# Patient Record
Sex: FEMALE | Race: WHITE | NOT HISPANIC OR LATINO
[De-identification: names, ages, dates, MRNs, and addresses within clinical notes are randomized per-mention and may not be internally consistent; named-entity substitution may affect disease eponyms.]

---

## 2019-06-12 ENCOUNTER — APPOINTMENT (OUTPATIENT)
Dept: ORTHOPEDIC SURGERY | Facility: CLINIC | Age: 49
End: 2019-06-12
Payer: COMMERCIAL

## 2019-06-12 VITALS — BODY MASS INDEX: 24.8 KG/M2 | HEIGHT: 63 IN | WEIGHT: 140 LBS | RESPIRATION RATE: 16 BRPM

## 2019-06-12 DIAGNOSIS — Z00.00 ENCOUNTER FOR GENERAL ADULT MEDICAL EXAMINATION W/OUT ABNORMAL FINDINGS: ICD-10-CM

## 2019-06-12 DIAGNOSIS — M77.11 LATERAL EPICONDYLITIS, RIGHT ELBOW: ICD-10-CM

## 2019-06-12 DIAGNOSIS — Z82.61 FAMILY HISTORY OF ARTHRITIS: ICD-10-CM

## 2019-06-12 DIAGNOSIS — Z87.891 PERSONAL HISTORY OF NICOTINE DEPENDENCE: ICD-10-CM

## 2019-06-12 DIAGNOSIS — M77.01 MEDIAL EPICONDYLITIS, RIGHT ELBOW: ICD-10-CM

## 2019-06-12 PROCEDURE — 73070 X-RAY EXAM OF ELBOW: CPT | Mod: RT

## 2019-06-12 PROCEDURE — 99203 OFFICE O/P NEW LOW 30 MIN: CPT

## 2023-06-13 ENCOUNTER — APPOINTMENT (OUTPATIENT)
Dept: ORTHOPEDIC SURGERY | Facility: CLINIC | Age: 53
End: 2023-06-13
Payer: COMMERCIAL

## 2023-06-13 ENCOUNTER — TRANSCRIPTION ENCOUNTER (OUTPATIENT)
Age: 53
End: 2023-06-13

## 2023-06-13 VITALS — WEIGHT: 135 LBS | BODY MASS INDEX: 23.92 KG/M2 | HEIGHT: 63 IN

## 2023-06-13 DIAGNOSIS — M77.10 LATERAL EPICONDYLITIS, UNSPECIFIED ELBOW: ICD-10-CM

## 2023-06-13 PROCEDURE — 99204 OFFICE O/P NEW MOD 45 MIN: CPT

## 2023-06-13 PROCEDURE — 72050 X-RAY EXAM NECK SPINE 4/5VWS: CPT

## 2023-06-13 RX ORDER — DICLOFENAC SODIUM 75 MG/1
75 TABLET, DELAYED RELEASE ORAL
Qty: 60 | Refills: 1 | Status: ACTIVE | COMMUNITY
Start: 2023-06-13 | End: 1900-01-01

## 2023-06-15 ENCOUNTER — NON-APPOINTMENT (OUTPATIENT)
Age: 53
End: 2023-06-15

## 2023-06-15 ENCOUNTER — APPOINTMENT (OUTPATIENT)
Dept: PHYSICAL MEDICINE AND REHAB | Facility: CLINIC | Age: 53
End: 2023-06-15
Payer: COMMERCIAL

## 2023-06-15 VITALS — BODY MASS INDEX: 23.92 KG/M2 | WEIGHT: 135 LBS | HEIGHT: 63 IN

## 2023-06-15 PROCEDURE — 99204 OFFICE O/P NEW MOD 45 MIN: CPT | Mod: 95

## 2023-06-15 NOTE — CONSULT LETTER
[FreeTextEntry1] : Dear Dr. MAYERS  \par \par I had the pleasure of evaluating your patient, LOKESH MONDRAGON .\par \par Thank you very much for allowing me to participate in the care of this patient. If you have any questions, please do not hesitate to contact me. \par \par Sincerely, \par Brian Dominique MD \par \par ABPMR Board Certified in Physical Medicine and Rehabilitation\par Certified Fellow of AANEM (Neuromuscular and Electrodiagnostic Medicine)\par Subspecialty certified in Sports Medicine (ABPMR)\par \par  of Physical Medicine and Rehabilitation\par [facility] [employer] [facility]\par [facility]\par \par

## 2023-06-15 NOTE — PHYSICAL EXAM
[Normal] : The appearance of the neck was normal, no neck mass was observed, the thyroid was not enlarged and there were no palpable thyroid nodules [de-identified] : moves digits well no swelling no wasting index tip left is numb

## 2023-06-15 NOTE — REVIEW OF SYSTEMS
[Joint Pain] : joint pain [Joint Stiffness] : joint stiffness [Muscle Pain] : muscle pain [Negative] : Heme/Lymph [Fever] : no fever [Lower Ext Edema] : no lower extremity edema [Muscle Weakness] : no muscle weakness [Difficulty Walking] : no difficulty walking

## 2023-06-15 NOTE — HISTORY OF PRESENT ILLNESS
[FreeTextEntry1] : Ms. LOKESH MONDRAGON is a very pleasant 52 year  RHD female who seen for evaluation of hand numbness 6 weeks but started over a year ago     without any specific injury or inciting event. The pain is located primarily left index is numb and feels swollen and r medial border hand feels numbish too  intermittent in nature and described as sharp throbbing stabbing  . The pain is rated as 3/10 during today's visit, and ranges from 2-7/10. The patient's symptoms are aggravated by unclear   and alleviated by nothing  . The patient denies any night pain,  feet numbness/tingling, no weakness, or bowel/bladder dysfunction. The patient has no other complaints at this time.\par this does not wake her up and it is random during day \par her enck has a lot of etnsion \par she is a  \par has had some PT no help \par

## 2023-06-15 NOTE — REASON FOR VISIT
[Home] : at home, [unfilled] , at the time of the visit. [Medical Office: ([facility])___] : at the medical office located in  [Patient] : the patient [Initial Evaluation] : an initial evaluation [FreeTextEntry1] : ref by Dr RIANA hiltonness

## 2023-06-15 NOTE — ASSESSMENT
[FreeTextEntry1] : \par PLAN AND RECOMMENDATIONS :\par \par We discussed differential diagnosis and clinical impression\par agree with EMG rule out CTS vs cerv radiculopathy \par \par Recommend\par .symptomatic care and support\par  medications NSAIDS as needed -  OTC fine (-personal preference )-(once or twice a day), -warned of  possible GI side effects -advised to take with meals or add over the counter Nexium, if sensitive\par \par  imaging as per ortho \par \par  hydrotherapy /heat / cold for pain\par  continue  ergonomic precautions including pacing ,posture and frequent breaks while typing.\par \par  relative rest and avoidance of painful activity where possible \par  increasing activity as discussed \par  return for EMG \par Information given to patient about EMG and Nerve Conduction Study Examination including  planning, differential diagnosis to rule in /rule out ,duration of the test ,precautions (if patient on blood thinner.has bleeding disorder or  pace maker device etc -still possible to undergo with care), side effects(benign-limited to short term bruising and discomfort/pain)  \par The protocol of temp checks upon arrival ,disinfection procedure of waiting room and the lab explained- reassured. \par All questions answered. \par Patient instructed to book appointment upon conclusion of appointment\par \par Information sheet ' Answers to your Questions on EMG " forwarded to patient to read prior to testing, with further information about training,background and the procedure itself .\par

## 2023-06-25 NOTE — HISTORY OF PRESENT ILLNESS
[de-identified] : 52 year old female presents with neck pain.  She has pain radiating to her arms. She reports numbness in her hands. She denies recent illness, fevers, weakness, balance problems, saddle anesthesia, urinary retention or fecal incontinence. Activity makes it worse. She denies injury.

## 2023-06-25 NOTE — PHYSICAL EXAM
[de-identified] : General: No acute distress, conversant, well-nourished.\par Head: Normocephalic, atraumatic\par Neck: trachea midline, FROM\par Heart: normotensive and normal rate and rhythm\par Lungs: No labored breathing\par Skin: No abrasions, no rashes, no edema\par Psych: Alert and oriented to person, place and time\par Extremities: no peripheral edema or digital cyanosis\par Gait: Normal gait. Can perform tandem gait.  \par Vascular: warm and well perfused distally, palpable distal pulses\par \par MSK:\par Spine: \par No tenderness to palpation.  No step-off, no deformity.\par \par NEURO:\par Sensation \par          Left           \par C5     2/2               \par C6     2/2               \par C7     2/2               \par C8     2/2              \par T1     2/2             \par \par          Right         \par C5     2/2               \par C6     2/2               \par C7     2/2               \par C8     2/2              \par T1     2/2      \par \par Motor: \par                                                Left             \par C5 (deltoid abduction)             5/5               \par C6 (biceps flexion)                   5/5                \par C7 (triceps extension)             5/5               \par C8 (finger flexion)                     5/5               \par T1 (interosseous)                     5/5           \par \par                                                Right           \par C5 (deltoid abduction)             5/5               \par C6 (biceps flexion)                   5/5                \par C7 (triceps extension)             5/5               \par C8 (finger flexion)                     5/5               \par T1 (interosseous)                     5/5                     \par \par Sensation \par Left L2  -  2/2            \par Left L3  -  2/2\par Left L4  -  2/2\par Left L5  -  2/2\par Left S1  -  2/2\par \par Right L2  -  2/2            \par Right L3  -  2/2\par Right L4  -  2/2\par Right L5  -  2/2\par Right S1  -  2/2\par \par Motor: \par Left L2 (hip flexion)                            5/5                \par Left L3 (knee extension)                   5/5                \par Left L4 (ankle dorsiflexion)                 5/5                \par Left L5 (long toe extensor)                5/5                \par Left S1 (ankle plantar flexion)           5/5\par \par Right L2 (hip flexion)                            5/5                \par Right L3 (knee extension)                   5/5                \par Right L4 (ankle dorsiflexion)                 5/5                \par Right L5 (long toe extensor)                5/5                \par Right S1 (ankle plantar flexion)           5/5\par \par Reflexes: Normal and symmetric\par Negative Spurling’s test.  Negative Bartholomew’s reflex.  \par Negative clonus.  Down-going Babinski. [de-identified] : I ordered radiographs to evaluate the patient's symptoms.\par \par Cervical 4 view radiographs taken in the office today show no dislocation or fracture. Cervical spondylosis.  No instability on dynamic series.

## 2023-06-25 NOTE — ASSESSMENT
[FreeTextEntry1] : 52 year old female presents with neck pain.  She has pain radiating to her arms. She reports numbness in her hands. She is otherwise neurologically intact.  The patient was given a referral for physical therapy. She will be sent for a cervical MRI.  She will be sent for a NCS/EMG. She can take diclofenac.  She will followup after her MRI. We discussed red flag symptoms that would require emergent evaluation. She knows to call with any questions or concerns or if her symptoms acutely worsen.

## 2023-06-28 ENCOUNTER — APPOINTMENT (OUTPATIENT)
Dept: ORTHOPEDIC SURGERY | Facility: CLINIC | Age: 53
End: 2023-06-28

## 2023-07-12 ENCOUNTER — APPOINTMENT (OUTPATIENT)
Dept: PHYSICAL MEDICINE AND REHAB | Facility: CLINIC | Age: 53
End: 2023-07-12
Payer: COMMERCIAL
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
## 2023-07-12 PROCEDURE — 95911 NRV CNDJ TEST 9-10 STUDIES: CPT

## 2023-07-12 PROCEDURE — 95886 MUSC TEST DONE W/N TEST COMP: CPT

## 2023-07-12 NOTE — PROCEDURE
[de-identified] : EMG /NERVE CONDUCTION STUDY performed today without complication\par \par Tabulated data, wave forms , conclusions and recommendations are attached and in the procedure report. \par Please refer to the scanned study attached to this encounter\par \par Full history and focused clinical exam performed prior to the examination and documented in report\par

## 2023-07-19 ENCOUNTER — APPOINTMENT (OUTPATIENT)
Dept: ORTHOPEDIC SURGERY | Facility: CLINIC | Age: 53
End: 2023-07-19
Payer: COMMERCIAL

## 2023-07-19 ENCOUNTER — APPOINTMENT (OUTPATIENT)
Dept: PAIN MANAGEMENT | Facility: CLINIC | Age: 53
End: 2023-07-19
Payer: COMMERCIAL

## 2023-07-19 VITALS
HEART RATE: 77 BPM | BODY MASS INDEX: 23.92 KG/M2 | SYSTOLIC BLOOD PRESSURE: 113 MMHG | HEIGHT: 63 IN | OXYGEN SATURATION: 99 % | WEIGHT: 135 LBS | DIASTOLIC BLOOD PRESSURE: 75 MMHG

## 2023-07-19 DIAGNOSIS — Z86.39 PERSONAL HISTORY OF OTHER ENDOCRINE, NUTRITIONAL AND METABOLIC DISEASE: ICD-10-CM

## 2023-07-19 DIAGNOSIS — G43.909 MIGRAINE, UNSPECIFIED, NOT INTRACTABLE, W/OUT STATUS MIGRAINOSUS: ICD-10-CM

## 2023-07-19 PROCEDURE — 99204 OFFICE O/P NEW MOD 45 MIN: CPT

## 2023-07-19 PROCEDURE — 99214 OFFICE O/P EST MOD 30 MIN: CPT

## 2023-07-19 RX ORDER — GABAPENTIN 300 MG/1
300 CAPSULE ORAL 3 TIMES DAILY
Qty: 90 | Refills: 0 | Status: ACTIVE | COMMUNITY
Start: 2023-07-19 | End: 1900-01-01

## 2023-07-20 NOTE — REVIEW OF SYSTEMS
[Incontinence] : incontinence [Radiating Pain] : radiating pain [Numbness] : numbness [Headache] : headache [Chills] : no chills [Fever] : no fever [Discharge] : no discharge [Decrease Hearing] : no decrease in hearing [SOB at rest] : no shortness of breath at rest [Lower Ext Edema] : no lower extremity edema [Abdominal Pain] : no abdominal pain [FreeTextEntry8] : stress incontinence  [de-identified] : chronic migraines

## 2023-07-20 NOTE — HISTORY OF PRESENT ILLNESS
[Neck Pain] : neck pain [___ yrs] : [unfilled] year(s) ago [0] : a minimum pain level of 0/10 [6] : a maximum pain level of 6/10 [Aching] : aching [Burning] : burning [FreeTextEntry1] : 52-year-old female with past medical history of hyperlipidemia and chronic migraines who presents today with chief complaint of progressive bilateral upper extremity paresthesias. She states the symptoms are worse than the left compared to the right hand. Symptoms are described as “sunburn sensation“. Involves digits 1 - 5 in the left hand, and mainly involves digit 2 in the right hand. Since again in the left D2 on February 2022, and then self resolved without intervention. Since then they have progressed to involved both of her extremities. \par \par Patient denies any new bowel changes. She has ongoing stress incontinence since 2020 where she intermittently  leaks small amounts when she coughs/ sneezes. She has tried about five weeks of physical therapy where they focused on nerve glide techniques. She is unsure if this is helping significantly. Dr. Crowe prescribed patient oral diclofenac 75 mg daily which states did not help with pain. 7/19/2023 she was prescribed Gabapentin 300mg TID. Patient states that there are no clear movements or activities that worsen or improve her symptoms. Pain can range anywhere from a zero to 6 out of 10. She currently works as a  and spends a lot of time typing on the computer.\par  [FreeTextEntry7] : Left greater than right pain [FreeTextEntry3] : Patient cannot clearly coordinate movements/ activities with worsening symptoms [FreeTextEntry4] : Patient cannot clearly coordinate movements/ activities with symptom improvement

## 2023-07-20 NOTE — PHYSICAL EXAM
[Normal muscle bulk without asymmetry] : normal muscle bulk without asymmetry [Bartholomew's Sign] : positive Bartholomew's Sign [] : Motor: [UE Motor Strength NL] : Motor strength of the bilateral upper extremities is normal [Motor Strength Upper Extremities] : left (5/5) [2+] : right brachioradialis 2+ [3+] : left brachioradialis 3+ [Cranial Nerves Oculomotor (III)] : extraocular motion intact [Cranial Nerves Vestibulocochlear (VIII)] : hearing was intact bilaterally [Normal] : Normal affect [Spurling] : negative Spurling's Test [Plantar Reflex Right Only] : absent on the right [Plantar Reflex Left Only] : absent on the left [de-identified] : nondistended [de-identified] : cervical active ROM WNL [de-identified] : No lesions noted in exposed areas of BUE

## 2023-07-25 ENCOUNTER — TRANSCRIPTION ENCOUNTER (OUTPATIENT)
Age: 53
End: 2023-07-25

## 2023-08-16 ENCOUNTER — APPOINTMENT (OUTPATIENT)
Dept: PAIN MANAGEMENT | Facility: CLINIC | Age: 53
End: 2023-08-16
Payer: COMMERCIAL

## 2023-08-16 VITALS — DIASTOLIC BLOOD PRESSURE: 72 MMHG | OXYGEN SATURATION: 98 % | SYSTOLIC BLOOD PRESSURE: 117 MMHG | HEART RATE: 77 BPM

## 2023-08-16 VITALS
OXYGEN SATURATION: 98 % | SYSTOLIC BLOOD PRESSURE: 118 MMHG | BODY MASS INDEX: 23.74 KG/M2 | WEIGHT: 134 LBS | DIASTOLIC BLOOD PRESSURE: 79 MMHG | HEIGHT: 63 IN | HEART RATE: 81 BPM

## 2023-08-16 PROCEDURE — 62321 NJX INTERLAMINAR CRV/THRC: CPT

## 2023-08-16 NOTE — ASSESSMENT
[FreeTextEntry1] : Patient presented today for scheduled cervical C7-T1 epidural steroid injection to help with neck and left arm/ shoulder pain.   Plan: - Follow up in 2-4 weeks to evaluate improvement in pain

## 2023-08-16 NOTE — PROCEDURE
[FreeTextEntry1] : Cervical RENEE with fluoroscopy C7- T1  The potential benefits as well as rare but possible risks were reviewed with the patient.  These risks including infection including epidural abscess, meningitis, osteomyelitis, and discitis, bleeding including epidural hematoma, nerve injury, paralysis, failure to relieve pain or worse pain, headache, pneumothorax, elevated blood sugars, allergic reactions, adverse reactions to medications, vasovagal reactions, falls, etc. Following that discussion, all questions were again answered to the patients satisfaction, the patient stated their verbal understanding and written consent was obtained.    After obtaining consent, pre-procedure blood pressure and pulse were recorded and are in the nursing record for review. The patient was placed in a prone position. The respective lumbosacral area was prepped with chloroprep and draped in sterile fashion.   The skin was anesthetized with 1% lidocaine. A 20G Tuohy needle, was used to access the target cervical epidural space using anatomic landmarks, x-ray guidance AP/contralateral oblique views, and loss of resistance. Confirmation of epidural placement was performed with injection of 1cc of omnipaque. There was no evidence of heme or CSF with needle placement. Aspiration was negative for CSF and heme.  At this point, 2 cc of preservative free normal saline and 10 mg dexamethasone was given . The needle was removed, skin cleansed and a sterile bandage was applied. The patient tolerated the procedure well and no complications were encountered. Following the procedure the patient's vital signs were stable. The patient was discharged home in good condition with post-procedural instructions.  Time Out: Immediately prior to the procedure, the following was verbally confirmed that there is a consent form and that the correct patient, planned procedure, site and side are consistent with documentation and that necessary equipment are available prior to the start of the case.  Complications: none EBL: <5 cc

## 2023-08-24 NOTE — ASSESSMENT
[FreeTextEntry1] : 52 year old female presents with neck pain.  She has pain radiating to her arms. She reports numbness in her hands. She is otherwise neurologically intact.  The pain has continued.  We reviewed her cervical MRI which shows degenerative changes without compression of the neural elements. We discussed treatment options including physical therapy, medications, and spinal injections. The patient was given a referral for consideration for spinal injections.  She can continue PT.  She can take diclofenac and gabapentin.  She will followup in 4-6 weeks We discussed red flag symptoms that would require emergent evaluation. She knows to call with any questions or concerns or if her symptoms acutely worsen.

## 2023-08-24 NOTE — HISTORY OF PRESENT ILLNESS
[de-identified] : 52 year old female followup with neck pain.  She has pain radiating to her arms. She reports numbness in her hands. She denies recent illness, fevers, weakness, balance problems, saddle anesthesia, urinary retention or fecal incontinence. Since the last visit the pain has continued.  She is here to review her MRI. She had NCS/EMG

## 2023-08-24 NOTE — PHYSICAL EXAM
[de-identified] : General: No acute distress, conversant, well-nourished.\par  Head: Normocephalic, atraumatic\par  Neck: trachea midline, FROM\par  Heart: normotensive and normal rate and rhythm\par  Lungs: No labored breathing\par  Skin: No abrasions, no rashes, no edema\par  Psych: Alert and oriented to person, place and time\par  Extremities: no peripheral edema or digital cyanosis\par  Gait: Normal gait. Can perform tandem gait.  \par  Vascular: warm and well perfused distally, palpable distal pulses\par  \par  MSK:\par  Spine: \par  No tenderness to palpation.  No step-off, no deformity.\par  \par  NEURO:\par  Sensation \par           Left           \par  C5     2/2               \par  C6     2/2               \par  C7     2/2               \par  C8     2/2              \par  T1     2/2             \par  \par           Right         \par  C5     2/2               \par  C6     2/2               \par  C7     2/2               \par  C8     2/2              \par  T1     2/2      \par  \par  Motor: \par                                                 Left             \par  C5 (deltoid abduction)             5/5               \par  C6 (biceps flexion)                   5/5                \par  C7 (triceps extension)             5/5               \par  C8 (finger flexion)                     5/5               \par  T1 (interosseous)                     5/5           \par  \par                                                 Right           \par  C5 (deltoid abduction)             5/5               \par  C6 (biceps flexion)                   5/5                \par  C7 (triceps extension)             5/5               \par  C8 (finger flexion)                     5/5               \par  T1 (interosseous)                     5/5                     \par  \par  Sensation \par  Left L2  -  2/2            \par  Left L3  -  2/2\par  Left L4  -  2/2\par  Left L5  -  2/2\par  Left S1  -  2/2\par  \par  Right L2  -  2/2            \par  Right L3  -  2/2\par  Right L4  -  2/2\par  Right L5  -  2/2\par  Right S1  -  2/2\par  \par  Motor: \par  Left L2 (hip flexion)                            5/5                \par  Left L3 (knee extension)                   5/5                \par  Left L4 (ankle dorsiflexion)                 5/5                \par  Left L5 (long toe extensor)                5/5                \par  Left S1 (ankle plantar flexion)           5/5\par  \par  Right L2 (hip flexion)                            5/5                \par  Right L3 (knee extension)                   5/5                \par  Right L4 (ankle dorsiflexion)                 5/5                \par  Right L5 (long toe extensor)                5/5                \par  Right S1 (ankle plantar flexion)           5/5\par  \par  Reflexes: Normal and symmetric\par  Negative Spurling's test.  Negative Bartholomew's reflex.  \par  Negative clonus.  Down-going Babinski. [de-identified] : Cervical MRI (7/6/23): Multilevel degenerative changes contributing to mild spinal canal stenosis and ventral cord contact at C3-4 and C4-5, and ventral thecal sac effacement at C5-6 and C6-7. Mild foraminal stenosis bilaterally at C3-4 and C5-6, on the right at C4-5, and on the left at C7-T1.  Cervical 4 view radiograph no dislocation or fracture. Cervical spondylosis.  No instability on dynamic series.

## 2023-09-06 ENCOUNTER — APPOINTMENT (OUTPATIENT)
Dept: ORTHOPEDIC SURGERY | Facility: CLINIC | Age: 53
End: 2023-09-06
Payer: COMMERCIAL

## 2023-09-06 ENCOUNTER — APPOINTMENT (OUTPATIENT)
Dept: PAIN MANAGEMENT | Facility: CLINIC | Age: 53
End: 2023-09-06
Payer: COMMERCIAL

## 2023-09-06 VITALS
DIASTOLIC BLOOD PRESSURE: 69 MMHG | SYSTOLIC BLOOD PRESSURE: 106 MMHG | HEART RATE: 89 BPM | OXYGEN SATURATION: 98 % | WEIGHT: 133 LBS | HEIGHT: 63 IN | BODY MASS INDEX: 23.57 KG/M2

## 2023-09-06 DIAGNOSIS — M79.18 MYALGIA, OTHER SITE: ICD-10-CM

## 2023-09-06 DIAGNOSIS — R20.2 ANESTHESIA OF SKIN: ICD-10-CM

## 2023-09-06 DIAGNOSIS — R20.0 ANESTHESIA OF SKIN: ICD-10-CM

## 2023-09-06 DIAGNOSIS — M54.2 CERVICALGIA: ICD-10-CM

## 2023-09-06 PROCEDURE — 99213 OFFICE O/P EST LOW 20 MIN: CPT

## 2023-09-06 PROCEDURE — 99214 OFFICE O/P EST MOD 30 MIN: CPT

## 2023-09-06 NOTE — REASON FOR VISIT
[Follow-Up Visit] : a follow-up visit for [Neck Pain] : neck pain [Other: ____] : [unfilled] [FreeTextEntry2] : still experiencing pain, pain level 5/10

## 2023-09-06 NOTE — ASSESSMENT
[FreeTextEntry1] : 52 year old female presents with neck pain.  She has pain radiating to her arms. She reports numbness in her hands. She is otherwise neurologically intact. Since the last visit she had a cervical epidural where almost complete relief of her symptoms. She no longer has radiating arm pain or numbness. She denies neck pain. She is very happy with her progress.  She can continue PT for her medial epicondylitis.  She can take diclofenac as needed.  She will followup in 2-3 months. We discussed red flag symptoms that would require emergent evaluation. She knows to call with any questions or concerns or if her symptoms acutely worsen.

## 2023-09-06 NOTE — HISTORY OF PRESENT ILLNESS
[de-identified] : 52 year old female followup with neck pain.  She had pain radiating to her arms. She had numbness in her hands. She denies recent illness, fevers, weakness, balance problems, saddle anesthesia, urinary retention or fecal incontinence. Since the last visit she had a cervical epidural where almost complete relief of her symptoms. She no longer has radiating arm pain or numbness. She denies neck pain.  Her medial epicondylitis is still bothering her and she is going to [facility].

## 2023-09-06 NOTE — PHYSICAL EXAM
[de-identified] : General: No acute distress, conversant, well-nourished.\par  Head: Normocephalic, atraumatic\par  Neck: trachea midline, FROM\par  Heart: normotensive and normal rate and rhythm\par  Lungs: No labored breathing\par  Skin: No abrasions, no rashes, no edema\par  Psych: Alert and oriented to person, place and time\par  Extremities: no peripheral edema or digital cyanosis\par  Gait: Normal gait. Can perform tandem gait.  \par  Vascular: warm and well perfused distally, palpable distal pulses\par  \par  MSK:\par  Spine: \par  No tenderness to palpation.  No step-off, no deformity.\par  \par  NEURO:\par  Sensation \par           Left           \par  C5     2/2               \par  C6     2/2               \par  C7     2/2               \par  C8     2/2              \par  T1     2/2             \par  \par           Right         \par  C5     2/2               \par  C6     2/2               \par  C7     2/2               \par  C8     2/2              \par  T1     2/2      \par  \par  Motor: \par                                                 Left             \par  C5 (deltoid abduction)             5/5               \par  C6 (biceps flexion)                   5/5                \par  C7 (triceps extension)             5/5               \par  C8 (finger flexion)                     5/5               \par  T1 (interosseous)                     5/5           \par  \par                                                 Right           \par  C5 (deltoid abduction)             5/5               \par  C6 (biceps flexion)                   5/5                \par  C7 (triceps extension)             5/5               \par  C8 (finger flexion)                     5/5               \par  T1 (interosseous)                     5/5                     \par  \par  Sensation \par  Left L2  -  2/2            \par  Left L3  -  2/2\par  Left L4  -  2/2\par  Left L5  -  2/2\par  Left S1  -  2/2\par  \par  Right L2  -  2/2            \par  Right L3  -  2/2\par  Right L4  -  2/2\par  Right L5  -  2/2\par  Right S1  -  2/2\par  \par  Motor: \par  Left L2 (hip flexion)                            5/5                \par  Left L3 (knee extension)                   5/5                \par  Left L4 (ankle dorsiflexion)                 5/5                \par  Left L5 (long toe extensor)                5/5                \par  Left S1 (ankle plantar flexion)           5/5\par  \par  Right L2 (hip flexion)                            5/5                \par  Right L3 (knee extension)                   5/5                \par  Right L4 (ankle dorsiflexion)                 5/5                \par  Right L5 (long toe extensor)                5/5                \par  Right S1 (ankle plantar flexion)           5/5\par  \par  Reflexes: Normal and symmetric\par  Negative Spurling's test.  Negative Bartholomew's reflex.  \par  Negative clonus.  Down-going Babinski. [de-identified] : Cervical MRI (7/6/23): Multilevel degenerative changes contributing to mild spinal canal stenosis and ventral cord contact at C3-4 and C4-5, and ventral thecal sac effacement at C5-6 and C6-7. Mild foraminal stenosis bilaterally at C3-4 and C5-6, on the right at C4-5, and on the left at C7-T1.  Cervical 4 view radiograph no dislocation or fracture. Cervical spondylosis.  No instability on dynamic series.

## 2023-09-07 ENCOUNTER — TRANSCRIPTION ENCOUNTER (OUTPATIENT)
Age: 53
End: 2023-09-07

## 2023-09-07 RX ORDER — DICLOFENAC SODIUM 75 MG/1
75 TABLET, DELAYED RELEASE ORAL
Qty: 60 | Refills: 1 | Status: ACTIVE | COMMUNITY
Start: 2023-09-07 | End: 1900-01-01

## 2023-09-08 NOTE — ASSESSMENT
[FreeTextEntry1] : Patient is a 52 year old female with past medical history of chronic migraines who is here today for follow up after having cervical epidural steroid injection on 08/16/23. Patient doing well post injection. Patient to continue physical therapy and at home exercises and stretches. Patient to follow up with Dr. Crowe for lateral epicondylitis and follow up here in 2 months or sooner if symptoms change.

## 2023-09-08 NOTE — REVIEW OF SYSTEMS
[Negative] : Respiratory [Neck Pain] : no neck pain [Muscle Pain] : no muscle pain [Decreased ROM] : no decreased range of motion [Weakness] : no weakness [de-identified] : Numbness rarely in hands

## 2023-09-08 NOTE — HISTORY OF PRESENT ILLNESS
[0] : a current pain level of 0/10 [FreeTextEntry1] : Patient is a 52 year old female with past medical history of chronic migraines who is here today for follow up after having cervical epidural steroid injection on 08/16/23. Patient reports pain has resolved since injection. Her pain is reported as a 0/10 today. Although the pain is no longer present, she has had two episodes of numbness in her hands, which resolved. She has been continuing her at home stretches and exercises from physical therapy. She saw Dr. Crowe this morning for her tennis elbow. He recommended continuing physical therapy and can restart prescribed anti-inflammatory medications.

## 2023-09-08 NOTE — PHYSICAL EXAM
[Normal muscle bulk without asymmetry] : normal muscle bulk without asymmetry [Within functional limits and without pain] : within functional limits and without pain [Normal] : Gait: normal [UE] : Sensory: Intact in bilateral upper extremities [Bicep] : biceps 2+ and symmetric bilaterally [B.R.] : Brachioradialis 2+ and symmetric bilaterally [Spinous Process Tenderness] : no spinous process tenderness [Facet Tenderness] : no facet tenderness [Paraspinal Tenderness] : no paraspinal tenderness

## 2023-12-06 ENCOUNTER — APPOINTMENT (OUTPATIENT)
Dept: ORTHOPEDIC SURGERY | Facility: CLINIC | Age: 53
End: 2023-12-06
Payer: COMMERCIAL

## 2023-12-06 ENCOUNTER — APPOINTMENT (OUTPATIENT)
Dept: PAIN MANAGEMENT | Facility: CLINIC | Age: 53
End: 2023-12-06
Payer: COMMERCIAL

## 2023-12-06 DIAGNOSIS — M50.90 CERVICAL DISC DISORDER, UNSPECIFIED, UNSPECIFIED CERVICAL REGION: ICD-10-CM

## 2023-12-06 DIAGNOSIS — M77.12 LATERAL EPICONDYLITIS, LEFT ELBOW: ICD-10-CM

## 2023-12-06 DIAGNOSIS — M54.12 RADICULOPATHY, CERVICAL REGION: ICD-10-CM

## 2023-12-06 PROCEDURE — 99214 OFFICE O/P EST MOD 30 MIN: CPT

## 2023-12-06 PROCEDURE — 99213 OFFICE O/P EST LOW 20 MIN: CPT

## 2023-12-25 NOTE — HISTORY OF PRESENT ILLNESS
[FreeTextEntry1] : Patient is a 53 year old female with past medical history of chronic migraines and lateral epicondylitis of the left elbow who is here today for follow up.  She continues to endorse pain relief from the cervical epidural steroid injection on 08/16/2023.  She reports her pain severity today as a 2/10. Although her elbow pain is well controlled, she has occasional sharp/stabbing pain flares and left upper extremity numbness with certain movements. She stopped taking her gabapentin after two doses as it made her feel a sense of imbalance even when sitting. She has not refilled her diclofenac as her pain flares are neither frequent nor long lasting. She is not in physical therapy at this time but she continues her at home stretches and exercises. She wears her arm brace for activities requiring heavy lifting. She has follow up with Dr. Crowe today.

## 2023-12-25 NOTE — ASSESSMENT
[FreeTextEntry1] : Luciana Medina is a 53 year old female with a history of chronic migraines and lateral epicondylitis of the left elbow who reports overall resolution of her left elbow pain from her cervical epidural steroid injection on 08/16/2023.  She is not taking any medications for pain and is not participating in physician guided physical therapy at this time. She is performing at home stretches and exercise and wears her arm brace for activities requiring heavy lifting.   Plan: - Resume physical therapy when able - referral provided this visit - Follow up as needed

## 2024-01-11 ENCOUNTER — APPOINTMENT (OUTPATIENT)
Dept: ORTHOPEDIC SURGERY | Facility: CLINIC | Age: 54
End: 2024-01-11
Payer: COMMERCIAL

## 2024-01-11 VITALS — HEIGHT: 63 IN | BODY MASS INDEX: 23.57 KG/M2 | WEIGHT: 133 LBS

## 2024-01-11 DIAGNOSIS — M17.0 BILATERAL PRIMARY OSTEOARTHRITIS OF KNEE: ICD-10-CM

## 2024-01-11 PROCEDURE — 73562 X-RAY EXAM OF KNEE 3: CPT | Mod: 50

## 2024-01-11 PROCEDURE — 99203 OFFICE O/P NEW LOW 30 MIN: CPT

## 2024-01-11 PROCEDURE — 99213 OFFICE O/P EST LOW 20 MIN: CPT

## 2024-01-13 PROBLEM — M17.0 ARTHRITIS OF BOTH KNEES: Status: ACTIVE | Noted: 2024-01-13

## 2024-01-13 NOTE — ASSESSMENT
[FreeTextEntry1] : Discussed at length with patient regarding symptoms and diagnosis.  Treatment options discussed and patient's questions answered and patient expresses understanding.   Patient offered physical therapy and elects home exercises/observation only.  Patient to follow-up in office if persistent or recurrent symptoms.  Patient instructed to call if any questions or concerns.

## 2024-01-13 NOTE — HISTORY OF PRESENT ILLNESS
[de-identified] : Initial Visit: Bilateral knee pain right worse than Left reason: no injury Pain Level: 8/10 Symptoms: sharp /shooting pain/ cracking Aggravating: Walking Alleviating: nothing helps Medical: lower back issues (Arthritis)- in physical therapy for Surgery: Doris elbow - 2010 / septum - 2014 Allergies: Compazine

## 2024-01-13 NOTE — PHYSICAL EXAM
[de-identified] : Bilateral knee  Constitutional:  The patient is healthy-appearing and in no apparent distress.   Gait: The patient ambulates with a normal gait and no limp.  Cardiovascular System:  The capillary refill is less than 2 seconds.   Skin:  There are no skin abnormalities.  Bilateral Knee:   Bony Palpation:  There is no tenderness of the medial joint line.  There is no tenderness of the lateral joint line. There is no tenderness of the medial femoral chondyle. There is no tenderness of the lateral femoral chondyle. There is no tenderness of the tibial tubercle. There is no tenderness of the superior patella. There is no tenderness of the inferior patella. There is tenderness of the medial patellar facet. There is tenderness of the lateral patellar facet.  Soft Tissue Palpation:  There is no tenderness of the medial retinaculum. There is no tenderness of the lateral retinaculum. There is no tenderness of the quadriceps tendon. There is no tenderness of the patella tendon. There is no tenderness of the ITB. There is no tenderness of the pes anserine.  Active Range of Motion:  The range of motion at the knee actively and passively is full.   Special Tests:  There is a negative Apley. There is a negative Steinmanns.  There is a negative Lachman and Anterior Drawer. There is a negative Posterior Drawer.   There is no varus or valgus laxity.  Strength:  There is 4/5 hip flexion and 5/5 knee flexion and extension.    Psychiatric:  The patient demonstrates a normal mood and affect and is active and alert.  [de-identified] : X-ray bilateral knee:  There is mild tricompartmental arthritis